# Patient Record
Sex: MALE | Race: BLACK OR AFRICAN AMERICAN | NOT HISPANIC OR LATINO | Employment: OTHER | URBAN - METROPOLITAN AREA
[De-identification: names, ages, dates, MRNs, and addresses within clinical notes are randomized per-mention and may not be internally consistent; named-entity substitution may affect disease eponyms.]

---

## 2017-03-27 ENCOUNTER — OFFICE VISIT (OUTPATIENT)
Dept: ORTHOPEDICS | Facility: CLINIC | Age: 57
End: 2017-03-27
Payer: MEDICAID

## 2017-03-27 VITALS — BODY MASS INDEX: 27.75 KG/M2 | WEIGHT: 193.81 LBS | HEIGHT: 70 IN

## 2017-03-27 DIAGNOSIS — C41.9 CHONDROSARCOMA: Primary | ICD-10-CM

## 2017-03-27 PROCEDURE — 99213 OFFICE O/P EST LOW 20 MIN: CPT | Mod: S$PBB,,, | Performed by: ORTHOPAEDIC SURGERY

## 2017-03-27 PROCEDURE — 99212 OFFICE O/P EST SF 10 MIN: CPT | Mod: PBBFAC | Performed by: ORTHOPAEDIC SURGERY

## 2017-03-27 PROCEDURE — 99999 PR PBB SHADOW E&M-EST. PATIENT-LVL II: CPT | Mod: PBBFAC,,, | Performed by: ORTHOPAEDIC SURGERY

## 2017-03-27 RX ORDER — POLYETHYLENE GLYCOL 3350 17 G/17G
17 POWDER, FOR SOLUTION ORAL
COMMUNITY

## 2017-03-27 NOTE — PROGRESS NOTES
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    He is now 3 years s/p sarcoma resection, and 1 1/2 years s/p resection for local recurrence. He has no new complaints today.  Pain scale is zero.  He denies feelings of recurrent mass.    ROS:   He denies cough, sputum production or shortness of breath.   He denies fever, chills or night sweats.     He denies distal paresthesias.   He .denies distal edema.    PE: Incision is well healed without erythema or warmth.   There is no palpable evidence of recurrent mass/local recurrence.   Motor function and sensation are intact distally.   There is .no distal edema.   FE 20 degrees decreased from right.      Impression: No clinical evidence of recurrent sarcoma.    Plan:  We will continue routine follow-up surveillance to monitor for local recurrence and metastatic disease.  CT chest and MRI right shoulder scheduled.  Will call with results.

## 2017-04-28 ENCOUNTER — HOSPITAL ENCOUNTER (OUTPATIENT)
Dept: RADIOLOGY | Facility: HOSPITAL | Age: 57
Discharge: HOME | End: 2017-04-28
Attending: ORTHOPAEDIC SURGERY
Payer: MEDICAID

## 2017-04-28 DIAGNOSIS — C41.9 CHONDROSARCOMA: ICD-10-CM

## 2017-04-28 PROCEDURE — A9585 GADOBUTROL INJECTION: HCPCS | Performed by: ORTHOPAEDIC SURGERY

## 2017-04-28 PROCEDURE — 73223 MRI JOINT UPR EXTR W/O&W/DYE: CPT | Mod: TC,LT

## 2017-04-28 PROCEDURE — 73223 MRI JOINT UPR EXTR W/O&W/DYE: CPT | Mod: 26,LT,, | Performed by: RADIOLOGY

## 2017-04-28 PROCEDURE — 71250 CT THORAX DX C-: CPT | Mod: TC

## 2017-04-28 PROCEDURE — 25500020 PHARM REV CODE 255: Performed by: ORTHOPAEDIC SURGERY

## 2017-04-28 PROCEDURE — 71250 CT THORAX DX C-: CPT | Mod: 26,,, | Performed by: RADIOLOGY

## 2017-04-28 RX ORDER — GADOBUTROL 604.72 MG/ML
10 INJECTION INTRAVENOUS
Status: COMPLETED | OUTPATIENT
Start: 2017-04-28 | End: 2017-04-28

## 2017-04-28 RX ADMIN — GADOBUTROL 10 ML: 604.72 INJECTION INTRAVENOUS at 12:04

## 2017-05-01 ENCOUNTER — OFFICE VISIT (OUTPATIENT)
Dept: ORTHOPEDICS | Facility: CLINIC | Age: 57
End: 2017-05-01
Payer: MEDICAID

## 2017-05-01 VITALS — BODY MASS INDEX: 27.98 KG/M2 | HEIGHT: 70 IN | WEIGHT: 195.44 LBS

## 2017-05-01 DIAGNOSIS — C41.9 CHONDROSARCOMA: Primary | ICD-10-CM

## 2017-05-01 PROCEDURE — 99213 OFFICE O/P EST LOW 20 MIN: CPT | Mod: PBBFAC | Performed by: ORTHOPAEDIC SURGERY

## 2017-05-01 PROCEDURE — 99212 OFFICE O/P EST SF 10 MIN: CPT | Mod: S$PBB,,, | Performed by: ORTHOPAEDIC SURGERY

## 2017-05-01 PROCEDURE — 99999 PR PBB SHADOW E&M-EST. PATIENT-LVL III: CPT | Mod: PBBFAC,,, | Performed by: ORTHOPAEDIC SURGERY

## 2017-05-01 NOTE — PROGRESS NOTES
Mr. James returns for evaluation of his left shoulder.  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  He has no new complaints today.  He had his repeat staging studies   done recently.    He denies any complaints of pain.  He states no limitations in activities of   daily living.    On physical examination, there is no palpable mass.    I have personally reviewed his CT scan and MRI scan with him.  There is no   evidence of metastatic disease or local recurrence.  I will plan on seeing him   back in six months for repeat CT chest and MRI scan of the shoulder.      MSM/HN  dd: 05/01/2017 09:59:40 (CDT)  td: 05/02/2017 00:47:06 (CDT)  Doc ID   #[id number]  Job ID #[id number]    CC:     [id number]

## 2017-11-01 ENCOUNTER — HOSPITAL ENCOUNTER (OUTPATIENT)
Dept: RADIOLOGY | Facility: HOSPITAL | Age: 57
Discharge: HOME | End: 2017-11-01
Attending: ORTHOPAEDIC SURGERY
Payer: MEDICAID

## 2017-11-01 DIAGNOSIS — C41.9 CHONDROSARCOMA: ICD-10-CM

## 2017-11-01 PROCEDURE — 73223 MRI JOINT UPR EXTR W/O&W/DYE: CPT | Mod: TC,LT

## 2017-11-01 PROCEDURE — 71250 CT THORAX DX C-: CPT | Mod: TC

## 2017-11-01 PROCEDURE — A9585 GADOBUTROL INJECTION: HCPCS | Performed by: ORTHOPAEDIC SURGERY

## 2017-11-01 PROCEDURE — 25500020 PHARM REV CODE 255: Performed by: ORTHOPAEDIC SURGERY

## 2017-11-01 PROCEDURE — 73223 MRI JOINT UPR EXTR W/O&W/DYE: CPT | Mod: 26,LT,, | Performed by: RADIOLOGY

## 2017-11-01 PROCEDURE — 71250 CT THORAX DX C-: CPT | Mod: 26,,, | Performed by: RADIOLOGY

## 2017-11-01 RX ORDER — GADOBUTROL 604.72 MG/ML
10 INJECTION INTRAVENOUS
Status: COMPLETED | OUTPATIENT
Start: 2017-11-01 | End: 2017-11-01

## 2017-11-01 RX ADMIN — GADOBUTROL 10 ML: 604.72 INJECTION INTRAVENOUS at 11:11
